# Patient Record
Sex: FEMALE | Race: WHITE | NOT HISPANIC OR LATINO | URBAN - NONMETROPOLITAN AREA
[De-identification: names, ages, dates, MRNs, and addresses within clinical notes are randomized per-mention and may not be internally consistent; named-entity substitution may affect disease eponyms.]

---

## 2017-06-08 NOTE — PATIENT DISCUSSION
AMD (DRY), OU: STABLE - PRESCRIBE AREDS 2 VITAMINS / AMSLER GRID QD/ UV PROTECTION. SMOKING CESSATION EMPHASIZED. RETURN FOR FOLLOW-UP AS SCHEDULED.

## 2017-12-07 NOTE — PATIENT DISCUSSION
AMD (DRY), OU: CONTINUE AREDS 2 VITAMINS / AMSLER GRID QD/ UV PROTECTION. SMOKING CESSATION EMPHASIZED. RETURN FOR FOLLOW-UP AS SCHEDULED.

## 2019-09-24 ENCOUNTER — IMPORTED ENCOUNTER (OUTPATIENT)
Dept: URBAN - NONMETROPOLITAN AREA CLINIC 1 | Facility: CLINIC | Age: 59
End: 2019-09-24

## 2019-09-24 PROBLEM — H18.51: Noted: 2019-09-24

## 2019-09-24 PROBLEM — H25.13: Noted: 2019-09-24

## 2019-09-24 PROBLEM — H52.03: Noted: 2019-09-24

## 2019-09-24 PROBLEM — H52.4: Noted: 2019-09-24

## 2019-09-24 PROCEDURE — 92310 CONTACT LENS FITTING OU: CPT

## 2019-09-24 PROCEDURE — S0621 ROUTINE OPHTHALMOLOGICAL EXA: HCPCS

## 2019-09-24 NOTE — PATIENT DISCUSSION
Cataracts OU-  discussed findings w/patient-  no treatment indicated at this time-  UV protection recommended-  continue to monitor yearly or prnEndothelial Dystrophy OU-  discussed findings w/patient-  no changes noted at this time-  continue to monitor yearly or prnSimple Hyperopia OU w/Presbyopia-  discussed findings w/patient-  new spectacle/CL Rx issued-  continue to monitor yearly or prn; 's Notes: MR 9/24/2019DFE 9/24/2019

## 2020-02-20 ENCOUNTER — IMPORTED ENCOUNTER (OUTPATIENT)
Dept: URBAN - NONMETROPOLITAN AREA CLINIC 1 | Facility: CLINIC | Age: 60
End: 2020-02-20

## 2020-02-20 PROBLEM — H02.88B: Noted: 2020-02-20

## 2020-02-20 PROBLEM — H25.13: Noted: 2020-02-20

## 2020-02-20 PROBLEM — H52.03: Noted: 2020-02-20

## 2020-02-20 PROBLEM — H02.88A: Noted: 2020-02-20

## 2020-02-20 PROBLEM — H18.51: Noted: 2020-02-20

## 2020-02-20 PROBLEM — H10.423: Noted: 2020-02-20

## 2020-02-20 PROBLEM — H52.4: Noted: 2020-02-20

## 2020-02-20 PROCEDURE — 99213 OFFICE O/P EST LOW 20 MIN: CPT

## 2020-02-20 NOTE — PATIENT DISCUSSION
MGD/Bleph-  discussed findings w/patient-  start hot compresses several times a day-  start Avenova QD OU-  continue to monitor as scheduled or prnOcular Allergies OU-  discussed findings w/patient-  start cool compresses (will have her alternate w/hot)-  start Alaway BID OU PRN-  continue to monitor as scheduled or prnCataracts OU-  discussed findings w/patient-  no treatment indicated at this time-  UV protection recommended-  continue to monitor yearly or prnEndothelial Dystrophy OU-  discussed findings w/patient-  no changes noted at this time-  continue to monitor yearly or prnSimple Hyperopia OU w/Presbyopia-  discussed findings w/patient-  new spectacle/CL Rx issued-  continue to monitor yearly or prn; 's Notes: MR 9/24/2019DFE 9/24/2019

## 2020-09-17 NOTE — PATIENT DISCUSSION
pt complained of some irritation in OD sounded like BLEPH, gave pt LS,told pt to do WC and tears. if no change to call back and dr saenz will take a look.

## 2021-07-05 ENCOUNTER — IMPORTED ENCOUNTER (OUTPATIENT)
Dept: URBAN - NONMETROPOLITAN AREA CLINIC 1 | Facility: CLINIC | Age: 61
End: 2021-07-05

## 2021-07-05 PROBLEM — H18.51: Noted: 2021-07-05

## 2021-07-05 PROBLEM — H25.13: Noted: 2021-07-05

## 2021-07-05 PROBLEM — H10.423: Noted: 2021-07-05

## 2021-07-05 PROBLEM — H52.03: Noted: 2021-07-05

## 2021-07-05 PROBLEM — H02.88A: Noted: 2021-07-05

## 2021-07-05 PROBLEM — H52.4: Noted: 2021-07-05

## 2021-07-05 PROBLEM — H02.88B: Noted: 2021-07-05

## 2021-07-05 PROCEDURE — S0621 ROUTINE OPHTHALMOLOGICAL EXA: HCPCS

## 2021-07-05 PROCEDURE — 92310 CONTACT LENS FITTING OU: CPT

## 2021-07-05 NOTE — PATIENT DISCUSSION
Simple Hyperopia OU w/Presbyopia-  discussed findings w/patient-  new spectacle/CL Rx issued-  continue to monitor yearly or prnMGD/Bleph OU-  discussed findings w/patient-  stable findings at this time-  continue hot compresses several times a day-  continue  Avenova QD OU-  continue to monitor as scheduled or prnOcular Allergies OU-  discussed findings w/patient-  stable findings at this time-  continue Alaway BID OU PRN-  continue to monitor as scheduled or prnCataracts OU-  discussed findings w/patient-  no treatment indicated at this time-  UV protection recommended-  continue to monitor yearly or prnEndothelial Dystrophy OU-  discussed findings w/patient-  no changes noted at this time-  continue to monitor yearly or prn; 's Notes: MR 7/5/2021DFE 7/5/2021

## 2022-01-13 NOTE — PATIENT DISCUSSION
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 This is often associated with myopic degeneration but can also be found in nonmyopic patients. Approximately 1% of patients with lattice degeneration will progress to symptomatic retinal detachment at some point. Most patients with lattice degeneration with or without atrophic holes can be observed without treatment. Indications for treatment include retinal detachment in the fellow eye, strong family history of retinal detachment, sudden onset of photopsias, open breaks with subretinal fluid, or extensive vitreal retinal traction. Most patients do not require intervention. The patient has risk factors for progression and will undergo laser photocoagulation. All of the findings were discussed in detail including the possibility of progressive retinal thinning/detachment, dilated pupil, photopsia, difficulty focusing and permanent vision loss with the patient. Retinal detachment warning signs were discussed.

## 2022-04-09 ASSESSMENT — TONOMETRY
OS_IOP_MMHG: 12
OS_IOP_MMHG: 12
OD_IOP_MMHG: 13
OD_IOP_MMHG: 11
OS_IOP_MMHG: 12
OD_IOP_MMHG: 10

## 2022-04-09 ASSESSMENT — VISUAL ACUITY
OD_SC: 20/30-2
OD_SC: 20/25-BLURRY
OU_CC: J2
OS_SC: 20/25
OS_SC: 20/30-2
OU_SC: 20/25
OD_GLARE: 20/30-
OD_SC: 20/25-BLURRY
OD_SC: 20/30
OS_SC: 20/25
OU_SC: 20/25
OU_SC: J2
OS_SC: 20/40+
OS_SC: 20/20-
OU_SC: J2